# Patient Record
Sex: FEMALE | Race: WHITE | URBAN - METROPOLITAN AREA
[De-identification: names, ages, dates, MRNs, and addresses within clinical notes are randomized per-mention and may not be internally consistent; named-entity substitution may affect disease eponyms.]

---

## 2023-07-06 ENCOUNTER — OFFICE VISIT (OUTPATIENT)
Age: 58
End: 2023-07-06

## 2023-07-06 VITALS
OXYGEN SATURATION: 99 % | SYSTOLIC BLOOD PRESSURE: 170 MMHG | HEART RATE: 74 BPM | WEIGHT: 137 LBS | DIASTOLIC BLOOD PRESSURE: 92 MMHG

## 2023-07-06 DIAGNOSIS — I48.0 PAROXYSMAL ATRIAL FIBRILLATION (HCC): Primary | ICD-10-CM

## 2023-07-06 RX ORDER — CHLORAL HYDRATE 500 MG
CAPSULE ORAL DAILY
COMMUNITY

## 2023-07-06 RX ORDER — FERROUS SULFATE 325(65) MG
325 TABLET ORAL
COMMUNITY

## 2023-07-06 RX ORDER — TRAZODONE HYDROCHLORIDE 50 MG/1
TABLET ORAL
COMMUNITY
Start: 2023-05-13

## 2023-07-06 RX ORDER — AMPICILLIN TRIHYDRATE 250 MG
CAPSULE ORAL
COMMUNITY

## 2023-07-06 RX ORDER — FLECAINIDE ACETATE 50 MG/1
1 TABLET ORAL 2 TIMES DAILY
COMMUNITY
Start: 2023-04-03

## 2023-07-06 RX ORDER — APIXABAN 5 MG/1
TABLET, FILM COATED ORAL
COMMUNITY
Start: 2023-05-18

## 2023-07-06 NOTE — PROGRESS NOTES
HPI:  [id number] here to establish care. She is following with Dr. Jimmy Baez for a number of years for atrial fibrillation. She was placed on flecainide and Eliquis back in 2021 after cardioversion. She notes intermittent nausea ever since then. No chest pain or syncope. She also has history of PVC's and they have been reasonably well-controlled with the flecainide. She had an echocardiogram and stress test about 2-3 years ago that were unremarkable. She is here to discuss options. Impression:   History of paroxysmal atrial fibrillation with cardioversion 2021, started on flecainide, Eliquis at that time with some mild nausea. History of PVC's on metoprolol. Echocardiogram and nuclear stress test a few years ago without ischemia and no LVH. Borderline dyslipidemia previously on Crestor but taken off due to concern for muscle aches. White coat syndrome elevation today but blood pressure at home very well controlled. Plan:  I had a lengthy discussion about flecainide potential side-effects, possibly switching to propafenone which also would help with her PVC's. I also talked about long-term ablation for both atrial fibrillation and PVC's. At this point, she is doing quite well and although she does have some GI upset, this is reasonably well-controlled with Gaviscon. She is on Eliquis for blood thinners without any bleeding issues. We also briefly talked about lipids. At this point, we will stay off Crestor. At this point, she elected to follow-up with me. I would be happy to see her back in six months and will consider echocardiogram and stress test routinely at that time given her class 1C agent use. She is switching to my office primarily due to location. Wt Readings from Last 3 Encounters:   07/06/23 137 lb (62.1 kg)     No past medical history on file.     Current Outpatient Medications   Medication Sig Dispense Refill    ELIQUIS 5 MG TABS tablet       flecainide (TAMBOCOR) 50

## 2023-09-22 ENCOUNTER — TELEPHONE (OUTPATIENT)
Age: 58
End: 2023-09-22

## 2023-09-22 NOTE — TELEPHONE ENCOUNTER
Patient called and left message either with concerns or questions regarding her Flecainide. Attempted to call back, left message for her to call back.

## 2023-09-27 RX ORDER — FLECAINIDE ACETATE 50 MG/1
50 TABLET ORAL 2 TIMES DAILY
Qty: 60 TABLET | Refills: 5 | Status: SHIPPED | OUTPATIENT
Start: 2023-09-27

## 2024-01-10 ENCOUNTER — OFFICE VISIT (OUTPATIENT)
Age: 59
End: 2024-01-10
Payer: OTHER GOVERNMENT

## 2024-01-10 VITALS
SYSTOLIC BLOOD PRESSURE: 110 MMHG | WEIGHT: 140 LBS | HEIGHT: 60 IN | OXYGEN SATURATION: 97 % | DIASTOLIC BLOOD PRESSURE: 60 MMHG | HEART RATE: 64 BPM | BODY MASS INDEX: 27.48 KG/M2

## 2024-01-10 DIAGNOSIS — I48.0 PAROXYSMAL ATRIAL FIBRILLATION (HCC): Primary | ICD-10-CM

## 2024-01-10 PROCEDURE — 93000 ELECTROCARDIOGRAM COMPLETE: CPT | Performed by: INTERNAL MEDICINE

## 2024-01-10 PROCEDURE — 99214 OFFICE O/P EST MOD 30 MIN: CPT | Performed by: INTERNAL MEDICINE

## 2024-01-10 RX ORDER — FLECAINIDE ACETATE 50 MG/1
50 TABLET ORAL 2 TIMES DAILY
Qty: 180 TABLET | Refills: 3 | Status: SHIPPED | OUTPATIENT
Start: 2024-01-10

## 2024-01-10 RX ORDER — APIXABAN 5 MG/1
5 TABLET, FILM COATED ORAL 2 TIMES DAILY
Qty: 180 TABLET | Refills: 3 | Status: SHIPPED | OUTPATIENT
Start: 2024-01-10

## 2024-01-10 NOTE — PROGRESS NOTES
History of Present Illness:  58 YOF here for follow up.  She established care with me from Dr. Gusman. She has a history of atrial fibrillation, on Eliquis and Flecainide. She has been doing well with rare PVCs.  No new chest pain, dyspnea, PND, orthopnea or edema.    Impression:  History of paroxysmal atrial fibrillation with cardioversion in 2021, on Flecainide and Eliquis at bedtime, also on Metoprolol.  History of PVCs.  Echocardiogram and nuclear stress test a few years ago without ischemia, no LVH.  Borderline dyslipidemia, previously on Crestor, but taken off due to concern for muscle aches.  History of white coat syndrome, well controlled today.    Plan:  Given her use of Flecainide and Metoprolol with PVCs and atrial fibrillation, we will obtain a follow up echocardiogram and nuclear stress test as she is on a class 1C agent.  As long as she is doing well, I will see back in six months.        Wt Readings from Last 3 Encounters:   01/10/24 63.5 kg (140 lb)   07/06/23 62.1 kg (137 lb)     No past medical history on file.    Current Outpatient Medications   Medication Sig Dispense Refill    ELIQUIS 5 MG TABS tablet Take 1 tablet by mouth 2 times daily 180 tablet 3    flecainide (TAMBOCOR) 50 MG tablet Take 1 tablet by mouth 2 times daily 180 tablet 3    metoprolol tartrate (LOPRESSOR) 25 MG tablet Take 1 tablet by mouth 2 times daily 180 tablet 3    ferrous sulfate (IRON 325) 325 (65 Fe) MG tablet Take 1 tablet by mouth daily (with breakfast)      traZODone (DESYREL) 50 MG tablet       Omega-3 Fatty Acids (FISH OIL) 1000 MG capsule Take by mouth daily      TURMERIC PO Take by mouth      Coenzyme Q10 (COQ10) 200 MG CAPS Take by mouth      Misc Natural Products (NEURIVA PO) Take by mouth       No current facility-administered medications for this visit.       Social History   reports that she has never smoked. She has never used smokeless tobacco.   has no history on file for alcohol use.    Family

## 2024-07-11 ENCOUNTER — OFFICE VISIT (OUTPATIENT)
Age: 59
End: 2024-07-11
Payer: OTHER GOVERNMENT

## 2024-07-11 VITALS
BODY MASS INDEX: 26.37 KG/M2 | DIASTOLIC BLOOD PRESSURE: 90 MMHG | OXYGEN SATURATION: 97 % | HEART RATE: 72 BPM | SYSTOLIC BLOOD PRESSURE: 140 MMHG | WEIGHT: 135 LBS

## 2024-07-11 DIAGNOSIS — R03.0 WHITE COAT SYNDROME WITHOUT DIAGNOSIS OF HYPERTENSION: ICD-10-CM

## 2024-07-11 DIAGNOSIS — E78.2 MIXED HYPERLIPIDEMIA: ICD-10-CM

## 2024-07-11 DIAGNOSIS — I48.91 ATRIAL FIBRILLATION, UNSPECIFIED TYPE (HCC): Primary | ICD-10-CM

## 2024-07-11 PROCEDURE — 99214 OFFICE O/P EST MOD 30 MIN: CPT | Performed by: INTERNAL MEDICINE

## 2024-07-11 NOTE — PROGRESS NOTES
History of Present Illness:  59 year-old female here for followup.  She established care me from Dr. Gusman.  She has a history of atrial fibrillation, on Eliquis and Flecainide.  She has occasional PVCs, which are her main symptoms.  No new chest pain, dyspnea, PND, orthopnea or edema.  She tries to stay active and monitor her diet.  She historically had been intolerant to Crestor.  She is still very worried about family heart disease like her mother and we reviewed today.  She does have a history of CABG, as well as atrial fibrillation.      Impression:  History of paroxysmal atrial fibrillation with cardioversion 2021 on Flecainide and Eliquis and Metoprolol, doing well.    Echocardiogram and nuclear stress test 02/2024 without ischemia and no LVH and normal EF.  She did have a small PFO.    Borderline dyslipidemia, previously on Crestor, but taken off due to muscle aches.    Strong family history of coronary artery disease.    History of white coat syndrome.  Well controlled at home.      Plan:  She is on Flecainide and Metoprolol.  She has a history of atrial fibrillation on Eliquis without bleeding issues.  She is very worried long term about developing heart disease and she had been intolerant to Crestor in the past.  I recommended CT calcium score for completeness and if it is abnormal we could at least consider starting Repatha.  All questions were answered.  I will see her back in six months.      If she does develop more breakthrough episodes, intolerant to higher dose of Flecainide, we would proceed directly to ablation.        Wt Readings from Last 3 Encounters:   07/11/24 61.2 kg (135 lb)   02/26/24 63.5 kg (140 lb)   02/26/24 63.5 kg (140 lb)     No past medical history on file.    Current Outpatient Medications   Medication Sig Dispense Refill    T-RQ TURMERIC + ROSAURA PO Take 2,400 mg by mouth      ELIQUIS 5 MG TABS tablet Take 1 tablet by mouth 2 times daily 180 tablet 3    flecainide (TAMBOCOR)

## 2024-09-09 ENCOUNTER — HOSPITAL ENCOUNTER (OUTPATIENT)
Facility: HOSPITAL | Age: 59
Discharge: HOME | End: 2024-09-12
Attending: INTERNAL MEDICINE

## 2024-09-09 DIAGNOSIS — E78.2 MIXED HYPERLIPIDEMIA: ICD-10-CM

## 2024-09-09 PROCEDURE — 75571 CT HRT W/O DYE W/CA TEST: CPT

## 2025-01-15 RX ORDER — METOPROLOL TARTRATE 25 MG/1
25 TABLET, FILM COATED ORAL 2 TIMES DAILY
Qty: 180 TABLET | Refills: 3 | Status: SHIPPED | OUTPATIENT
Start: 2025-01-15

## 2025-01-15 RX ORDER — FLECAINIDE ACETATE 50 MG/1
50 TABLET ORAL 2 TIMES DAILY
Qty: 180 TABLET | Refills: 3 | Status: SHIPPED | OUTPATIENT
Start: 2025-01-15

## 2025-02-06 ENCOUNTER — OFFICE VISIT (OUTPATIENT)
Age: 60
End: 2025-02-06
Payer: OTHER GOVERNMENT

## 2025-02-06 VITALS
WEIGHT: 139.2 LBS | HEIGHT: 60 IN | OXYGEN SATURATION: 98 % | DIASTOLIC BLOOD PRESSURE: 88 MMHG | HEART RATE: 69 BPM | SYSTOLIC BLOOD PRESSURE: 128 MMHG | BODY MASS INDEX: 27.33 KG/M2

## 2025-02-06 DIAGNOSIS — I48.91 ATRIAL FIBRILLATION, UNSPECIFIED TYPE (HCC): Primary | ICD-10-CM

## 2025-02-06 DIAGNOSIS — E78.5 DYSLIPIDEMIA: ICD-10-CM

## 2025-02-06 PROCEDURE — 99214 OFFICE O/P EST MOD 30 MIN: CPT | Performed by: INTERNAL MEDICINE

## 2025-02-06 NOTE — PROGRESS NOTES
HPI:  A 60-year-old female here for followup.  She has history of atrial fibrillation on Eliquis and flecainide, very well controlled.  She also has occasional PVCs.  No new chest pain, dyspnea, PND, orthopnea, edema.  She had recent CT calcium score of 0 in September 2024.    Impression:  History of paroxysmal atrial fibrillation, cardioversion in 2021, on flecainide, Eliquis, metoprolol, doing well.  History of PVCs, stable.  Echocardiogram and nuclear stress test in February 2024 without ischemia, no LVH and normal EF.  Small PFO present.  Borderline dyslipidemia, previously tried on Crestor but taken off due to muscle aches.  Recent CT calcium score in September 2024 of 0.  History of white coat syndrome, well controlled at home.    Plan:  She remains on Eliquis and flecainide as well as beta-blocker for history of atrial fibrillation that also helps some of the PVCs which is a major symptom.  She has not had any prolonged episodes.  We will continue to monitor.  Her CT calcium score is reviewed, essentially 0, so I would not be aggressive with her lipids as her last LDL was only 100.  She will continue with lifestyle changes and I will see back annually unless there is a clinical change.        Wt Readings from Last 3 Encounters:   02/06/25 63.1 kg (139 lb 3.2 oz)   07/11/24 61.2 kg (135 lb)   02/26/24 63.5 kg (140 lb)     No past medical history on file.    Current Outpatient Medications   Medication Sig Dispense Refill    metoprolol tartrate (LOPRESSOR) 25 MG tablet TAKE 1 TABLET BY MOUTH TWICE DAILY 180 tablet 3    flecainide (TAMBOCOR) 50 MG tablet TAKE 1 TABLET BY MOUTH TWICE DAILY 180 tablet 3    ELIQUIS 5 MG TABS tablet Take 1 tablet by mouth 2 times daily 180 tablet 3    traZODone (DESYREL) 50 MG tablet       Omega-3 Fatty Acids (FISH OIL) 1000 MG capsule Take by mouth daily      Coenzyme Q10 (COQ10) 200 MG CAPS Take by mouth      Misc Natural Products (NEURIVA PO) Take by mouth      T-RQ TURMERIC +

## 2025-02-19 RX ORDER — APIXABAN 5 MG/1
5 TABLET, FILM COATED ORAL 2 TIMES DAILY
Qty: 180 TABLET | Refills: 3 | Status: SHIPPED | OUTPATIENT
Start: 2025-02-19